# Patient Record
Sex: MALE | Race: WHITE | Employment: FULL TIME | URBAN - METROPOLITAN AREA
[De-identification: names, ages, dates, MRNs, and addresses within clinical notes are randomized per-mention and may not be internally consistent; named-entity substitution may affect disease eponyms.]

---
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
## 2019-04-20 ENCOUNTER — OFFICE VISIT (OUTPATIENT)
Dept: URGENT CARE | Facility: URGENT CARE | Age: 19
End: 2019-04-20
Payer: COMMERCIAL

## 2019-04-20 VITALS
DIASTOLIC BLOOD PRESSURE: 72 MMHG | SYSTOLIC BLOOD PRESSURE: 109 MMHG | OXYGEN SATURATION: 96 % | HEART RATE: 99 BPM | TEMPERATURE: 98.2 F

## 2019-04-20 DIAGNOSIS — H10.32 ACUTE CONJUNCTIVITIS OF LEFT EYE, UNSPECIFIED ACUTE CONJUNCTIVITIS TYPE: Primary | ICD-10-CM

## 2019-04-20 PROCEDURE — 99213 OFFICE O/P EST LOW 20 MIN: CPT | Performed by: PHYSICIAN ASSISTANT

## 2019-04-20 RX ORDER — NEOMYCIN POLYMYXIN B SULFATES AND DEXAMETHASONE 3.5; 10000; 1 MG/ML; [USP'U]/ML; MG/ML
1 SUSPENSION/ DROPS OPHTHALMIC 3 TIMES DAILY
Qty: 5 ML | Refills: 0 | Status: SHIPPED | OUTPATIENT
Start: 2019-04-20 | End: 2019-04-27

## 2019-04-20 ASSESSMENT — ENCOUNTER SYMPTOMS
EYE DISCHARGE: 0
EYE PAIN: 1
EYE REDNESS: 1
FEVER: 0
COUGH: 0

## 2019-04-20 NOTE — PROGRESS NOTES
SUBJECTIVE:   Oleg De La O is a 18 year old male presenting with a chief complaint of   Chief Complaint   Patient presents with     Urgent Care     Eye Problem     Possible Lt eye infection x1 week, slept with contacts in- throbbing pain, visible redness       He is an established patient of [facility].    Eye Problem    Onset of symptoms was a few days ago.   Location: left eye   Course of illness is worsening.    Severity moderate  Current and Associated symptoms: pain, redness  Treatment measures tried include none  Context: Contact lens use. He slept with his contacts in.      Review of Systems   Constitutional: Negative for fever.   HENT: Negative for ear pain.    Eyes: Positive for pain and redness. Negative for discharge and visual disturbance.   Respiratory: Negative for cough.        History reviewed. No pertinent past medical history.  Family History   Problem Relation Age of Onset     Family History Negative Mother      Asthma Father      Depression Maternal Grandmother      Diabetes Maternal Grandmother         borderline     Current Outpatient Medications   Medication Sig Dispense Refill     citalopram (CELEXA) 10 MG tablet TAKE ONE TABLET BY MOUTH ONCE DAILY 60 tablet 0     neomycin-polymixin-dexamethasone (MAXITROL) 0.1 % ophthalmic suspension Place 1 drop Into the left eye 3 times daily for 7 days 5 mL 0     Acetaminophen (TYLENOL PO) Reported on 5/19/2017       ibuprofen (ADVIL/MOTRIN) 200 MG tablet Take 3 tablets (600 mg) by mouth every 6 hours as needed for mild pain or fever (Patient not taking: Reported on 12/28/2018) 60 tablet 0     Social History     Tobacco Use     Smoking status: Never Smoker     Smokeless tobacco: Never Used   Substance Use Topics     Alcohol use: No     Alcohol/week: 0.0 oz       OBJECTIVE  /72 (BP Location: Right arm, Patient Position: Chair, Cuff Size: Adult Regular)   Pulse 99   Temp 98.2  F (36.8  C) (Oral)   SpO2 96%     Physical Exam   Constitutional: He  appears well-developed. No distress.   HENT:   Head: Normocephalic.   Eyes: Pupils are equal, round, and reactive to light. EOM are normal. Right eye exhibits no discharge. Left eye exhibits no discharge. Left conjunctiva is injected.   No tenderness to palpation over orbital rim. No evidence of orbital cellulitis.   Cardiovascular: Regular rhythm and normal heart sounds.   Pulmonary/Chest: Effort normal and breath sounds normal. No respiratory distress.   Neurological: He is alert.       Labs:  No results found for this or any previous visit (from the past 24 hour(s)).        ASSESSMENT:      ICD-10-CM    1. Acute conjunctivitis of left eye, unspecified acute conjunctivitis type H10.32 neomycin-polymixin-dexamethasone (MAXITROL) 0.1 % ophthalmic suspension          PLAN:    Acute conjunctivitis, left: Maxitrol eyedrops are prescribed.  Good handwashing is advised. Should not wear contact lens until infection has cleared.  Follow-up if any worsening symptoms. Patient understands and agrees with the plan.        Followup:    If not improving or if condition worsens, follow up with your Primary Care Provider

## 2019-07-30 ENCOUNTER — OFFICE VISIT (OUTPATIENT)
Dept: URGENT CARE | Facility: URGENT CARE | Age: 19
End: 2019-07-30
Payer: COMMERCIAL

## 2019-07-30 VITALS
HEART RATE: 60 BPM | DIASTOLIC BLOOD PRESSURE: 70 MMHG | WEIGHT: 165.7 LBS | SYSTOLIC BLOOD PRESSURE: 122 MMHG | HEIGHT: 72 IN | OXYGEN SATURATION: 97 % | TEMPERATURE: 98.7 F | RESPIRATION RATE: 12 BRPM | BODY MASS INDEX: 22.44 KG/M2

## 2019-07-30 DIAGNOSIS — R21 RASH: Primary | ICD-10-CM

## 2019-07-30 DIAGNOSIS — N48.9 PENILE LESION: ICD-10-CM

## 2019-07-30 PROCEDURE — 99213 OFFICE O/P EST LOW 20 MIN: CPT | Performed by: FAMILY MEDICINE

## 2019-07-30 RX ORDER — PREDNISONE 20 MG/1
TABLET ORAL
Qty: 8 TABLET | Refills: 0 | Status: SHIPPED | OUTPATIENT
Start: 2019-07-30 | End: 2019-11-11

## 2019-07-30 ASSESSMENT — MIFFLIN-ST. JEOR: SCORE: 1804.61

## 2019-07-30 NOTE — PATIENT INSTRUCTIONS
Prednisone 40mg a day for 3 days, 20mg a day for 2 days  -- take this in the morning each day    If your itching becomes bothersome try benadryl 25-50mg every 8 hours as needed (careful as this can make you drowsy)

## 2019-11-11 ENCOUNTER — ANCILLARY PROCEDURE (OUTPATIENT)
Dept: GENERAL RADIOLOGY | Facility: CLINIC | Age: 19
End: 2019-11-11
Attending: FAMILY MEDICINE
Payer: COMMERCIAL

## 2019-11-11 ENCOUNTER — OFFICE VISIT (OUTPATIENT)
Dept: URGENT CARE | Facility: URGENT CARE | Age: 19
End: 2019-11-11
Payer: COMMERCIAL

## 2019-11-11 VITALS
WEIGHT: 172.6 LBS | DIASTOLIC BLOOD PRESSURE: 86 MMHG | BODY MASS INDEX: 23.41 KG/M2 | TEMPERATURE: 98.4 F | HEART RATE: 73 BPM | OXYGEN SATURATION: 95 % | SYSTOLIC BLOOD PRESSURE: 120 MMHG

## 2019-11-11 DIAGNOSIS — J20.9 ACUTE BRONCHITIS, UNSPECIFIED ORGANISM: ICD-10-CM

## 2019-11-11 DIAGNOSIS — R07.0 THROAT PAIN: Primary | ICD-10-CM

## 2019-11-11 DIAGNOSIS — R05.9 COUGH: ICD-10-CM

## 2019-11-11 LAB
DEPRECATED S PYO AG THROAT QL EIA: NORMAL
HETEROPH AB SER QL: NEGATIVE
SPECIMEN SOURCE: NORMAL

## 2019-11-11 PROCEDURE — 99214 OFFICE O/P EST MOD 30 MIN: CPT | Performed by: FAMILY MEDICINE

## 2019-11-11 PROCEDURE — 86308 HETEROPHILE ANTIBODY SCREEN: CPT | Performed by: FAMILY MEDICINE

## 2019-11-11 PROCEDURE — 36415 COLL VENOUS BLD VENIPUNCTURE: CPT | Performed by: FAMILY MEDICINE

## 2019-11-11 PROCEDURE — 87798 DETECT AGENT NOS DNA AMP: CPT | Performed by: FAMILY MEDICINE

## 2019-11-11 PROCEDURE — 71046 X-RAY EXAM CHEST 2 VIEWS: CPT

## 2019-11-11 PROCEDURE — 87880 STREP A ASSAY W/OPTIC: CPT | Performed by: FAMILY MEDICINE

## 2019-11-11 RX ORDER — CODEINE PHOSPHATE AND GUAIFENESIN 10; 100 MG/5ML; MG/5ML
2 SOLUTION ORAL EVERY 6 HOURS PRN
Qty: 118 ML | Refills: 0 | Status: SHIPPED | OUTPATIENT
Start: 2019-11-11 | End: 2020-01-14

## 2019-11-11 RX ORDER — BENZONATATE 200 MG/1
200 CAPSULE ORAL 3 TIMES DAILY PRN
Qty: 30 CAPSULE | Refills: 0 | Status: SHIPPED | OUTPATIENT
Start: 2019-11-11 | End: 2020-01-14

## 2019-11-11 NOTE — PATIENT INSTRUCTIONS
Okay to take ibuprofen 200 mg - 4 tablets (800 mg) every 8 hours as needed.  Okay to take tylenol 500 mg - 2 tablets (1000 mg) every 6-8 hours as needed, do not exceed 3000 mg in 24 hours.  Take tessalon perle for cough during the day and robitussin with codeine at bedtime.    We will contact you if the results are positive - throat culture, pertussis screen and Xray formal report.      Patient Education     Acute Bronchitis  Your healthcare provider has told you that you have acute bronchitis. Bronchitis is infection or inflammation of the bronchial tubes (airways in the lungs). Normally, air moves easily in and out of the airways. Bronchitis narrows the airways, making it harder for air to flow in and out of the lungs. This causes symptoms such as shortness of breath, coughing up yellow or green mucus, and wheezing. Bronchitis can be acute or chronic. Acute means the condition comes on quickly and goes away in a short time, usually within 3 to 10 days. Chronic means a condition lasts a long time and often comes back.    What causes acute bronchitis?  Acute bronchitis almost always starts as a viral respiratory infection, such as a cold or the flu. Certain factors make it more likely for a cold or flu to turn into bronchitis. These include being very young, being elderly, having a heart or lung problem, or having a weak immune system. Cigarette smoking also makes bronchitis more likely.  When bronchitis develops, the airways become swollen. The airways may also become infected with bacteria. This is known as a secondary infection.  Diagnosing acute bronchitis  Your healthcare provider will examine you and ask about your symptoms and health history. You may also have a sputum culture to test the fluid in your lungs. Chest X-rays may be done to look for infection in the lungs.  Treating acute bronchitis  Bronchitis usually clears up as the cold or flu goes away. You can help feel better faster by doing the  following:    Take medicine as directed. You may be told to take ibuprofen or other over-the-counter medicines. These help relieve inflammation in your bronchial tubes. Your healthcare provider may prescribe an inhaler to help open up the bronchial tubes. Most of the time, acute bronchitis is caused by a viral infection. Antibiotics are usually not prescribed for viral infections.    Drink plenty of fluids, such as water, juice, or warm soup. Fluids loosen mucus so that you can cough it up. This helps you breathe more easily. Fluids also prevent dehydration.    Make sure you get plenty of rest.    Do not smoke. Do not allow anyone else to smoke in your home.  Recovery and follow-up  Follow up with your doctor as you are told. You will likely feel better in a week or two. But a dry cough can linger beyond that time. Let your doctor know if you still have symptoms (other than a dry cough) after 2 weeks, or if you re prone to getting bronchial infections. Take steps to protect yourself from future infections. These steps include stopping smoking and avoiding tobacco smoke, washing your hands often, and getting a yearly flu shot.  When to call your healthcare provider  Call the healthcare provider if you have any of the following:    Fever of 100.4 F (38.0 C) or higher, or as advised    Symptoms that get worse, or new symptoms    Trouble breathing    Symptoms that don t start to improve within a week, or within 3 days of taking antibiotics   Date Last Reviewed: 12/1/2016 2000-2018 [employer]. [address] [place] [address], [place] [address]. All rights reserved. This information is not intended as a substitute for professional medical care. Always follow your healthcare professional's instructions.

## 2019-11-11 NOTE — PROGRESS NOTES
SUBJECTIVE:   Oleg De La O is a 18 year old male presenting with a chief complaint of sore throat, cough and congestion.  Sore throat has gotten worse.  No fever.  Endorsed feeling hot and cold symptoms.  Denies any N/V/D or rash.  Denies prior history of mono.  Coughing spells, will wake up and go into coughing fits  Onset of symptoms was 1 week(s) ago.  Course of illness is worsening.    Severity moderate  Current and Associated symptoms: sore throat, cough  Treatment measures tried include Fluids, Rest and Dayquil, Nyquil.  Predisposing factors include: TOB use.    Tdap 2012    No past medical history on file.  Current Outpatient Medications   Medication Sig Dispense Refill     Acetaminophen (TYLENOL PO) Reported on 5/19/2017       citalopram (CELEXA) 10 MG tablet TAKE ONE TABLET BY MOUTH ONCE DAILY 60 tablet 0     ibuprofen (ADVIL/MOTRIN) 200 MG tablet Take 3 tablets (600 mg) by mouth every 6 hours as needed for mild pain or fever 60 tablet 0     Social History     Tobacco Use     Smoking status: Current Some Day Smoker     Packs/day: 0.00     Smokeless tobacco: Never Used   Substance Use Topics     Alcohol use: No     Alcohol/week: 0.0 standard drinks       ROS:  Review of systems negative except as stated above.    OBJECTIVE:  /86 (BP Location: Right arm, Patient Position: Sitting, Cuff Size: Adult Regular)   Pulse 73   Temp 98.4  F (36.9  C) (Tympanic)   Wt 78.3 kg (172 lb 9.6 oz)   SpO2 95%   BMI 23.41 kg/m    GENERAL APPEARANCE: healthy, alert and no distress  EYES: EOMI,  PERRL, conjunctiva clear  HENT: ear canals and TM's normal.  Nose and mouth without ulcers, erythema or lesions.  No sinus tenderness on percussion  NECK: supple, nontender, no lymphadenopathy  RESP: lungs few scattered rhonchi, coarse, no crackles or wheezes  CV: regular rates and rhythm, normal S1 S2, no murmur noted  Extremities: no peripheral edema or tenderness, peripheral pulses normal  PSYCH: mentation appears  normal and affect normal/bright    Results for orders placed or performed in visit on 11/11/19   Mononucleosis screen     Status: None   Result Value Ref Range    Mononucleosis Screen Negative NEG^Negative   Strep, Rapid Screen     Status: None   Result Value Ref Range    Specimen Description Throat     Rapid Strep A Screen       NEGATIVE: No Group A streptococcal antigen detected by immunoassay, await culture report.       CXR - no acute infiltrate, no pleural effusion, no pneumothorax personally viewed by me    ASSESSMENT/PLAN:  (R07.0) Throat pain  (primary encounter diagnosis)  Comment: viral  Plan: Strep, Rapid Screen, Beta strep group A         culture, Mononucleosis screen            (R05) Cough  Comment: viral/bronchitis  Plan: B. pertussis/parapertussis PCR-NP, XR Chest 2         Views, benzonatate (TESSALON) 200 MG capsule,         guaiFENesin-codeine (ROBITUSSIN AC) 100-10         MG/5ML solution            (J20.9) Acute bronchitis, unspecified organism  Comment: viral  Plan: benzonatate (TESSALON) 200 MG capsule,         guaiFENesin-codeine (ROBITUSSIN AC) 100-10         MG/5ML solution            Reassurance given, reviewed initial labs, discussed symptomatic treatment with tylenol, ibuprofen, plenty of fluids and rest.  Will follow up on throat culture and treat if positive for strep.  Reviewed that due to coughing and coughing fits, will screen for pertussis, has an 8 month old infant at home.  Reviewed that if positive for pertussis that will require antibiotic treatment, but the cough will linger for many months.  RX tessalon perles and RX luis enrique AC given to help with cough.  Will follow up on formal Xray report and notify if any abnormalities.    Follow up with primary provider if no improvement of symptoms in 1 week    Ever San MD, MD  November 11, 2019 5:36 PM

## 2019-11-13 LAB
B PARAPERT DNA SPEC QL NAA+PROBE: NOT DETECTED
B PERT DNA SPEC QL NAA+PROBE: NOT DETECTED
BORDETELLA COMMENT: NORMAL

## 2019-12-11 ENCOUNTER — OFFICE VISIT (OUTPATIENT)
Dept: PEDIATRICS | Facility: CLINIC | Age: 19
End: 2019-12-11
Payer: COMMERCIAL

## 2019-12-11 DIAGNOSIS — Q15.9 EYE ABNORMALITIES: Primary | ICD-10-CM

## 2019-12-11 PROCEDURE — 99213 OFFICE O/P EST LOW 20 MIN: CPT | Performed by: PEDIATRICS

## 2019-12-11 ASSESSMENT — PATIENT HEALTH QUESTIONNAIRE - PHQ9: SUM OF ALL RESPONSES TO PHQ QUESTIONS 1-9: 7

## 2019-12-12 NOTE — PROGRESS NOTES
Subjective    Oleg De La O is a 18 year old male who presents to clinic today with self because of:  Red Eye     HPI   Eye Problem    Problem started: 1 months ago  Location:  Both  Pain:  YES  Redness:  YES  Discharge:  no  Swelling  YES  Vision problems:  no  History of trauma or foreign body:  no  Sick contacts: None;  Therapies Tried: eye drops    Would wake up and some irritation corner of eye.    Would try to wash it with water but would continue feeling like something there.    Off/on hurting.   Not waking up with crusting.   Little watery.  Varies which eye is bothering him by week.  Red out type drops.  Using contacts.  If happens takes out contacts.  Did have it happen one time when wasn't wearing.    Has had some ?arthritis in past.  Not really having joint issue currently.    Review of Systems  Constitutional, eye, ENT, skin, respiratory, cardiac, and GI are normal except as otherwise noted.    Problem List  Patient Active Problem List    Diagnosis Date Noted     Chlamydia infection 07/04/2018     Priority: Medium     Moderate episode of recurrent major depressive disorder (H) 01/26/2018     Priority: Medium     Anxiety 01/26/2018     Priority: Medium     Concussion, without loss of consciousness, subsequent encounter 05/21/2017     Priority: Medium     Glenoid labral tear 04/24/2015     Priority: Medium     Right shoulder pain 03/11/2015     Priority: Medium     No active medical problems 08/17/2012     Priority: Medium      Medications  citalopram (CELEXA) 10 MG tablet, TAKE ONE TABLET BY MOUTH ONCE DAILY  Acetaminophen (TYLENOL PO), Reported on 5/19/2017  benzonatate (TESSALON) 200 MG capsule, Take 1 capsule (200 mg) by mouth 3 times daily as needed for cough (Patient not taking: Reported on 12/11/2019)  guaiFENesin-codeine (ROBITUSSIN AC) 100-10 MG/5ML solution, Take 10 mLs by mouth every 6 hours as needed for cough (Patient not taking: Reported on 12/11/2019)  ibuprofen (ADVIL/MOTRIN) 200 MG  tablet, Take 3 tablets (600 mg) by mouth every 6 hours as needed for mild pain or fever (Patient not taking: Reported on 12/11/2019)    No current facility-administered medications on file prior to visit.     Allergies  Allergies   Allergen Reactions     No Known Drug Allergies      Reviewed and updated as needed this visit by Provider           Objective    There were no vitals taken for this visit.  No weight on file for this encounter.    Physical Exam  GENERAL: Active, alert, in no acute distress.  SKIN: Clear. No significant rash, abnormal pigmentation or lesions  HEAD: Normocephalic.  EYES: sclera with quite a bit of redness laterall on left.  No mattering.  EARS: Normal canals. Tympanic membranes are normal; gray and translucent.  NOSE: Normal without discharge.  MOUTH/THROAT: Clear. No oral lesions. Teeth intact without obvious abnormalities.  NECK: Supple, no masses.  LYMPH NODES: No adenopathy  LUNGS: Clear. No rales, rhonchi, wheezing or retractions  HEART: Regular rhythm. Normal S1/S2. No murmurs.  ABDOMEN: Soft, non-tender, not distended, no masses or hepatosplenomegaly. Bowel sounds normal.     Diagnostics: None      Assessment & Plan    Irritation of eye.  Would have concerns around inflammation/possible association with inflammatory process.  Also concerns around issues with contacts/irritation.  Will give referral, reviewed having him seen within two weeks    Follow Up  Return in about 2 weeks (around 12/25/2019) for call if not getting in within couple weeks..  Plan:  Referal(s) given today as per orders.     Horacio Quintanilla MD

## 2020-01-14 ENCOUNTER — ANCILLARY PROCEDURE (OUTPATIENT)
Dept: GENERAL RADIOLOGY | Facility: CLINIC | Age: 20
End: 2020-01-14
Attending: FAMILY MEDICINE
Payer: COMMERCIAL

## 2020-01-14 ENCOUNTER — OFFICE VISIT (OUTPATIENT)
Dept: URGENT CARE | Facility: URGENT CARE | Age: 20
End: 2020-01-14
Payer: COMMERCIAL

## 2020-01-14 VITALS
DIASTOLIC BLOOD PRESSURE: 70 MMHG | RESPIRATION RATE: 18 BRPM | BODY MASS INDEX: 23.03 KG/M2 | HEIGHT: 72 IN | TEMPERATURE: 99.8 F | OXYGEN SATURATION: 97 % | HEART RATE: 120 BPM | SYSTOLIC BLOOD PRESSURE: 120 MMHG | WEIGHT: 170 LBS

## 2020-01-14 DIAGNOSIS — R19.7 DIARRHEA, UNSPECIFIED TYPE: Primary | ICD-10-CM

## 2020-01-14 LAB
BASOPHILS # BLD AUTO: 0 10E9/L (ref 0–0.2)
BASOPHILS NFR BLD AUTO: 0.1 %
DIFFERENTIAL METHOD BLD: ABNORMAL
EOSINOPHIL # BLD AUTO: 0 10E9/L (ref 0–0.7)
EOSINOPHIL NFR BLD AUTO: 0.3 %
ERYTHROCYTE [DISTWIDTH] IN BLOOD BY AUTOMATED COUNT: 13.5 % (ref 10–15)
FLUAV+FLUBV AG SPEC QL: NEGATIVE
FLUAV+FLUBV AG SPEC QL: NEGATIVE
HCT VFR BLD AUTO: 48.2 % (ref 40–53)
HGB BLD-MCNC: 17 G/DL (ref 13.3–17.7)
LYMPHOCYTES # BLD AUTO: 0.4 10E9/L (ref 0.8–5.3)
LYMPHOCYTES NFR BLD AUTO: 2.6 %
MCH RBC QN AUTO: 29.7 PG (ref 26.5–33)
MCHC RBC AUTO-ENTMCNC: 35.3 G/DL (ref 31.5–36.5)
MCV RBC AUTO: 84 FL (ref 78–100)
MONOCYTES # BLD AUTO: 0.8 10E9/L (ref 0–1.3)
MONOCYTES NFR BLD AUTO: 5.5 %
NEUTROPHILS # BLD AUTO: 12.9 10E9/L (ref 1.6–8.3)
NEUTROPHILS NFR BLD AUTO: 91.5 %
PLATELET # BLD AUTO: 300 10E9/L (ref 150–450)
RBC # BLD AUTO: 5.73 10E12/L (ref 4.4–5.9)
SPECIMEN SOURCE: NORMAL
WBC # BLD AUTO: 14.1 10E9/L (ref 4–11)

## 2020-01-14 PROCEDURE — 36415 COLL VENOUS BLD VENIPUNCTURE: CPT | Performed by: FAMILY MEDICINE

## 2020-01-14 PROCEDURE — 74019 RADEX ABDOMEN 2 VIEWS: CPT

## 2020-01-14 PROCEDURE — 87804 INFLUENZA ASSAY W/OPTIC: CPT | Mod: 59 | Performed by: FAMILY MEDICINE

## 2020-01-14 PROCEDURE — 83690 ASSAY OF LIPASE: CPT | Performed by: FAMILY MEDICINE

## 2020-01-14 PROCEDURE — 85025 COMPLETE CBC W/AUTO DIFF WBC: CPT | Performed by: FAMILY MEDICINE

## 2020-01-14 PROCEDURE — 80076 HEPATIC FUNCTION PANEL: CPT | Performed by: FAMILY MEDICINE

## 2020-01-14 PROCEDURE — 99214 OFFICE O/P EST MOD 30 MIN: CPT | Performed by: FAMILY MEDICINE

## 2020-01-14 RX ORDER — OMEPRAZOLE 40 MG/1
40 CAPSULE, DELAYED RELEASE ORAL DAILY
Qty: 20 CAPSULE | Refills: 1 | Status: SHIPPED | OUTPATIENT
Start: 2020-01-14 | End: 2020-09-29

## 2020-01-14 RX ORDER — ONDANSETRON 4 MG/1
4 TABLET, FILM COATED ORAL EVERY 8 HOURS PRN
Qty: 12 TABLET | Refills: 0 | Status: SHIPPED | OUTPATIENT
Start: 2020-01-14 | End: 2020-09-29

## 2020-01-14 ASSESSMENT — ENCOUNTER SYMPTOMS
NAUSEA: 1
VOMITING: 1
DIARRHEA: 1

## 2020-01-14 ASSESSMENT — MIFFLIN-ST. JEOR: SCORE: 1819.11

## 2020-01-15 LAB
ALBUMIN SERPL-MCNC: 4.7 G/DL (ref 3.4–5)
ALP SERPL-CCNC: 84 U/L (ref 40–150)
ALT SERPL W P-5'-P-CCNC: 17 U/L (ref 0–70)
AST SERPL W P-5'-P-CCNC: 20 U/L (ref 0–45)
BILIRUB DIRECT SERPL-MCNC: 0.2 MG/DL (ref 0–0.2)
BILIRUB SERPL-MCNC: 1 MG/DL (ref 0.2–1.3)
LIPASE SERPL-CCNC: 48 U/L (ref 73–393)
PROT SERPL-MCNC: 8.5 G/DL (ref 6.8–8.8)

## 2020-01-15 NOTE — PROGRESS NOTES
SUBJECTIVE:   Oleg De La O is a 19 year old male presenting with a chief complaint of   Chief Complaint   Patient presents with     Gastrointestinal Problem     Acute onset of nausea and vomiting that started about 3:00 in the morning.  This was followed by diarrhea.    He is an established patient of [facility].    Abdominal Pain    Location: epigastric   Radiation: None.    Pain character: aching,   Severity: 6 on a scale of 1-10.    Duration: 1 day(s)   Course of Illness: fluctuating.  Exacerbated by: nothing  Relieved by: nothing.  Associated Symptoms: nausea, vomiting and diarrhea.  Female : Not applicable  Surgical History: none        Review of Systems   Gastrointestinal: Positive for diarrhea, nausea and vomiting.   All other systems reviewed and are negative.      No past medical history on file.  Family History   Problem Relation Age of Onset     Family History Negative Mother      Asthma Father      Depression Maternal Grandmother      Diabetes Maternal Grandmother         borderline     Current Outpatient Medications   Medication Sig Dispense Refill     Acetaminophen (TYLENOL PO) Reported on 5/19/2017       citalopram (CELEXA) 10 MG tablet TAKE ONE TABLET BY MOUTH ONCE DAILY 60 tablet 0     ibuprofen (ADVIL/MOTRIN) 200 MG tablet Take 3 tablets (600 mg) by mouth every 6 hours as needed for mild pain or fever 60 tablet 0     omeprazole (PRILOSEC) 40 MG DR capsule Take 1 capsule (40 mg) by mouth daily 20 capsule 1     ondansetron (ZOFRAN) 4 MG tablet Take 1 tablet (4 mg) by mouth every 8 hours as needed for nausea 12 tablet 0     Social History     Tobacco Use     Smoking status: Current Some Day Smoker     Packs/day: 0.00     Smokeless tobacco: Never Used   Substance Use Topics     Alcohol use: No     Alcohol/week: 0.0 standard drinks       OBJECTIVE  /70 (BP Location: Right arm, Patient Position: Chair, Cuff Size: Adult Regular)   Pulse 120   Temp 99.8  F (37.7  C) (Oral)   Resp 18   Ht  1.829 m (6')   Wt 77.1 kg (170 lb)   SpO2 97%   BMI 23.06 kg/m      Physical Exam  Vitals signs and nursing note reviewed.   Constitutional:       Appearance: Normal appearance.   HENT:      Head: Normocephalic.      Right Ear: Tympanic membrane normal.      Left Ear: Tympanic membrane normal.      Nose: Nose normal.   Eyes:      Extraocular Movements: Extraocular movements intact.      Pupils: Pupils are equal, round, and reactive to light.   Neck:      Musculoskeletal: Normal range of motion and neck supple.   Cardiovascular:      Rate and Rhythm: Normal rate and regular rhythm.   Pulmonary:      Effort: Pulmonary effort is normal.      Breath sounds: Normal breath sounds.   Abdominal:      General: Abdomen is flat.      Palpations: Abdomen is soft.      Comments: Slight tenderness palpation lower the umbilicus nothing in the right or left lower quadrants   Musculoskeletal: Normal range of motion.   Skin:     General: Skin is warm and dry.   Neurological:      Mental Status: He is alert.         Labs:  Results for orders placed or performed in visit on 01/14/20 (from the past 24 hour(s))   CBC with platelets and differential   Result Value Ref Range    WBC 14.1 (H) 4.0 - 11.0 10e9/L    RBC Count 5.73 4.4 - 5.9 10e12/L    Hemoglobin 17.0 13.3 - 17.7 g/dL    Hematocrit 48.2 40.0 - 53.0 %    MCV 84 78 - 100 fl    MCH 29.7 26.5 - 33.0 pg    MCHC 35.3 31.5 - 36.5 g/dL    RDW 13.5 10.0 - 15.0 %    Platelet Count 300 150 - 450 10e9/L    % Neutrophils 91.5 %    % Lymphocytes 2.6 %    % Monocytes 5.5 %    % Eosinophils 0.3 %    % Basophils 0.1 %    Absolute Neutrophil 12.9 (H) 1.6 - 8.3 10e9/L    Absolute Lymphocytes 0.4 (L) 0.8 - 5.3 10e9/L    Absolute Monocytes 0.8 0.0 - 1.3 10e9/L    Absolute Eosinophils 0.0 0.0 - 0.7 10e9/L    Absolute Basophils 0.0 0.0 - 0.2 10e9/L    Diff Method Automated Method    Influenza A/B antigen   Result Value Ref Range    Influenza A/B Agn Specimen Nasal     Influenza A Negative  NEG^Negative    Influenza B Negative NEG^Negative       X-Ray was done, my findings are: There is no evidence of obstruction    ASSESSMENT:      ICD-10-CM    1. Diarrhea, unspecified type R19.7 CBC with platelets and differential     Lipase     Hepatic panel     XR Abdomen 2 Views     Influenza A/B antigen     omeprazole (PRILOSEC) 40 MG DR capsule     ondansetron (ZOFRAN) 4 MG tablet      2.  Gastroenteritis    Appears to have a stomach flu.  Is a slightly elevated white count however I do not see any signs or symptoms that suggest an appendicitis however we did talk about this.  Seems to be getting better his diarrhea has stopped     medical Decision Making:    Differential Diagnosis:  Gastroenteritis , abdominal pain, appendicitis, colitis,    Serious Comorbid Conditions:  Adult:  None    PLAN:    1.  Increase his fluids electrolyte formulations such as Pedialyte  2 Zofran.   3.  Prilosec    Followup:    Follow-up in 7 to 10 days with your primary sooner if increasing symptoms    There are no Patient Instructions on file for this visit.

## 2020-02-23 ENCOUNTER — OFFICE VISIT (OUTPATIENT)
Dept: URGENT CARE | Facility: URGENT CARE | Age: 20
End: 2020-02-23
Payer: COMMERCIAL

## 2020-02-23 VITALS
SYSTOLIC BLOOD PRESSURE: 124 MMHG | OXYGEN SATURATION: 97 % | DIASTOLIC BLOOD PRESSURE: 80 MMHG | BODY MASS INDEX: 23.19 KG/M2 | HEART RATE: 68 BPM | WEIGHT: 171 LBS | TEMPERATURE: 98.3 F

## 2020-02-23 DIAGNOSIS — R30.0 DYSURIA: ICD-10-CM

## 2020-02-23 DIAGNOSIS — Z11.3 SCREEN FOR STD (SEXUALLY TRANSMITTED DISEASE): Primary | ICD-10-CM

## 2020-02-23 PROCEDURE — 86780 TREPONEMA PALLIDUM: CPT | Performed by: FAMILY MEDICINE

## 2020-02-23 PROCEDURE — 87491 CHLMYD TRACH DNA AMP PROBE: CPT | Performed by: FAMILY MEDICINE

## 2020-02-23 PROCEDURE — 87591 N.GONORRHOEAE DNA AMP PROB: CPT | Performed by: FAMILY MEDICINE

## 2020-02-23 PROCEDURE — 86803 HEPATITIS C AB TEST: CPT | Performed by: FAMILY MEDICINE

## 2020-02-23 PROCEDURE — 87340 HEPATITIS B SURFACE AG IA: CPT | Performed by: FAMILY MEDICINE

## 2020-02-23 PROCEDURE — 87389 HIV-1 AG W/HIV-1&-2 AB AG IA: CPT | Performed by: FAMILY MEDICINE

## 2020-02-23 PROCEDURE — 36415 COLL VENOUS BLD VENIPUNCTURE: CPT | Performed by: FAMILY MEDICINE

## 2020-02-23 PROCEDURE — 99213 OFFICE O/P EST LOW 20 MIN: CPT | Performed by: FAMILY MEDICINE

## 2020-02-23 PROCEDURE — 86704 HEP B CORE ANTIBODY TOTAL: CPT | Performed by: FAMILY MEDICINE

## 2020-02-23 NOTE — PROGRESS NOTES
SUBJECTIVE:   Oleg De La O is a 19 year old male with past h/o chlamydia  presenting for std testing   Has sexual intercourse with new partner 7 days back and would like std testing   Denies urinary frequency or burning with urination   He is an established patient of [facility].  Severity mild  Has minor burning with urination just noticed it today when collecting urine   Current and Associated symptoms: none   Treatment measures tried include None tried.  Predisposing factors include new partner .    History reviewed. No pertinent past medical history.  Current Outpatient Medications   Medication Sig Dispense Refill     Acetaminophen (TYLENOL PO) Reported on 5/19/2017       citalopram (CELEXA) 10 MG tablet TAKE ONE TABLET BY MOUTH ONCE DAILY (Patient not taking: Reported on 2/23/2020) 60 tablet 0     ibuprofen (ADVIL/MOTRIN) 200 MG tablet Take 3 tablets (600 mg) by mouth every 6 hours as needed for mild pain or fever (Patient not taking: Reported on 2/23/2020) 60 tablet 0     omeprazole (PRILOSEC) 40 MG DR capsule Take 1 capsule (40 mg) by mouth daily (Patient not taking: Reported on 2/23/2020) 20 capsule 1     ondansetron (ZOFRAN) 4 MG tablet Take 1 tablet (4 mg) by mouth every 8 hours as needed for nausea (Patient not taking: Reported on 2/23/2020) 12 tablet 0     Social History     Tobacco Use     Smoking status: Current Some Day Smoker     Packs/day: 0.00     Smokeless tobacco: Never Used   Substance Use Topics     Alcohol use: No     Alcohol/week: 0.0 standard drinks     Family History   Problem Relation Age of Onset     Family History Negative Mother      Asthma Father      Depression Maternal Grandmother      Diabetes Maternal Grandmother         borderline         ROS:    10 point ROS of systems including Constitutional, Eyes, Respiratory, Cardiovascular, Gastroenterology,  Integumentary, Muscularskeletal, Psychiatric were all negative except for pertinent positives noted in my HPI          OBJECTIVE:  /80 (BP Location: Right arm, Patient Position: Chair, Cuff Size: Adult Regular)   Pulse 68   Temp 98.3  F (36.8  C) (Oral)   Wt 77.6 kg (171 lb)   SpO2 97%   BMI 23.19 kg/m    GENERAL APPEARANCE: healthy, alert and no distress  EYES: EOMI,  PERRL, conjunctiva clear  HENT: ear canals and TM's normal.  Nose and mouth without ulcers, erythema or lesions  NECK: supple, nontender, no lymphadenopathy  RESP: lungs clear to auscultation - no rales, rhonchi or wheezes  CV: regular rates and rhythm, normal S1 S2, no murmur noted  ABDOMEN:  soft, nontender, no HSM or masses and bowel sounds normal  SKIN: no suspicious lesions or rashes  - deferred  PSYCH: mentation appears normal  Physical Exam          Medical Decision Making:    Differential Diagnosis:  UTI: UTI, Dysuria, Kidney Stone, Urethritis, STD and Interstitial Cystitis      ASSESSMENT:  Oleg was seen today for urgent care and std.    Diagnoses and all orders for this visit:    Screen for STD (sexually transmitted disease)  -     NEISSERIA GONORRHOEA PCR  -     CHLAMYDIA TRACHOMATIS PCR  -     Hepatitis B core antibody  -     Hepatitis B surface antigen  -     Hepatitis C antibody  -     HIV Antigen Antibody Combo  -     Treponema Abs w Reflex to RPR and Titer    Dysuria  -     UA reflex to Microscopic and Culture          PLAN:  Fluids and Rest  Follow up if  symptoms fail to improve or worsens   Pt understood and agreed with plan   He was notified that he will be called with the results only if there is any test result positive for infection.  In the meantime patient was suggested if notices any new symptoms such as worsening dysuria or any penile discharge should follow-up.  Follow up if  symptoms fail to improve or worsens   Pt understood and agreed with plan     Nhung Peres MD       See orders in Epic

## 2020-02-24 ENCOUNTER — TELEPHONE (OUTPATIENT)
Dept: URGENT CARE | Facility: URGENT CARE | Age: 20
End: 2020-02-24

## 2020-02-24 DIAGNOSIS — A74.9 CHLAMYDIA: Primary | ICD-10-CM

## 2020-02-24 LAB
C TRACH DNA SPEC QL NAA+PROBE: POSITIVE
HBV CORE AB SERPL QL IA: NONREACTIVE
HBV SURFACE AG SERPL QL IA: NONREACTIVE
HCV AB SERPL QL IA: NONREACTIVE
HIV 1+2 AB+HIV1 P24 AG SERPL QL IA: NONREACTIVE
N GONORRHOEA DNA SPEC QL NAA+PROBE: NEGATIVE
SPECIMEN SOURCE: ABNORMAL
SPECIMEN SOURCE: NORMAL
T PALLIDUM AB SER QL: NONREACTIVE

## 2020-02-24 RX ORDER — AZITHROMYCIN 500 MG/1
1000 TABLET, FILM COATED ORAL ONCE
Qty: 2 TABLET | Refills: 0 | Status: SHIPPED | OUTPATIENT
Start: 2020-02-24 | End: 2020-02-24

## 2020-02-24 NOTE — TELEPHONE ENCOUNTER
Positive chlamydia- spoke with patient-  Sent Rx of azithromycin 1 gm to [facility] [address]  Recommend informing any sexual partners    Marisol Echeverria MD

## 2020-03-04 ENCOUNTER — OFFICE VISIT (OUTPATIENT)
Dept: URGENT CARE | Facility: URGENT CARE | Age: 20
End: 2020-03-04
Payer: COMMERCIAL

## 2020-03-04 VITALS
SYSTOLIC BLOOD PRESSURE: 112 MMHG | HEART RATE: 68 BPM | TEMPERATURE: 97.8 F | OXYGEN SATURATION: 98 % | DIASTOLIC BLOOD PRESSURE: 68 MMHG

## 2020-03-04 DIAGNOSIS — Z86.19 HISTORY OF CHLAMYDIA: Primary | ICD-10-CM

## 2020-03-04 PROCEDURE — 99213 OFFICE O/P EST LOW 20 MIN: CPT | Performed by: PHYSICIAN ASSISTANT

## 2020-03-05 ASSESSMENT — ENCOUNTER SYMPTOMS
NAUSEA: 0
VOMITING: 0
CHILLS: 0
DYSURIA: 0
ABDOMINAL PAIN: 0
FEVER: 0
DIARRHEA: 0

## 2020-03-05 NOTE — PROGRESS NOTES
SUBJECTIVE:   Oleg De La O is a 19 year old male presenting with a chief complaint of   Chief Complaint   Patient presents with     Urgent Care     STD     Had been Dx with positive STD -1 week ago, last dose of ABX 2/24.       He is an established patient of [facility].    Patient presenting to urgent care today for a recheck. He was diagnosed with chlamydia 2/24/2020. Treated with 1g Zithromax. Patient denies today any abnormal penile discharge. He would like to be retested to make sure the infection has cleared. No fever/chills. No abdominal pain. No dysuria. Denies penile lesion. No longer with previous sexual partner.    Review of Systems   Constitutional: Negative for chills and fever.   Gastrointestinal: Negative for abdominal pain, diarrhea, nausea and vomiting.   Genitourinary: Negative for discharge, dysuria and genital sores.       History reviewed. No pertinent past medical history.  Family History   Problem Relation Age of Onset     Family History Negative Mother      Asthma Father      Depression Maternal Grandmother      Diabetes Maternal Grandmother         borderline     Current Outpatient Medications   Medication Sig Dispense Refill     Acetaminophen (TYLENOL PO) Reported on 5/19/2017       citalopram (CELEXA) 10 MG tablet TAKE ONE TABLET BY MOUTH ONCE DAILY (Patient not taking: Reported on 2/23/2020) 60 tablet 0     ibuprofen (ADVIL/MOTRIN) 200 MG tablet Take 3 tablets (600 mg) by mouth every 6 hours as needed for mild pain or fever (Patient not taking: Reported on 2/23/2020) 60 tablet 0     omeprazole (PRILOSEC) 40 MG DR capsule Take 1 capsule (40 mg) by mouth daily (Patient not taking: Reported on 2/23/2020) 20 capsule 1     ondansetron (ZOFRAN) 4 MG tablet Take 1 tablet (4 mg) by mouth every 8 hours as needed for nausea (Patient not taking: Reported on 2/23/2020) 12 tablet 0     Social History     Tobacco Use     Smoking status: Current Some Day Smoker     Packs/day: 0.00     Smokeless  tobacco: Never Used   Substance Use Topics     Alcohol use: No     Alcohol/week: 0.0 standard drinks       OBJECTIVE  /68 (BP Location: Right arm, Patient Position: Chair, Cuff Size: Adult Regular)   Pulse 68   Temp 97.8  F (36.6  C) (Tympanic)   SpO2 98%     Physical Exam  Vitals signs and nursing note reviewed.   Constitutional:       General: He is not in acute distress.     Appearance: He is well-developed.   HENT:      Head: Normocephalic and atraumatic.      Right Ear: External ear normal.      Left Ear: External ear normal.   Eyes:      Conjunctiva/sclera: Conjunctivae normal.   Neck:      Musculoskeletal: Normal range of motion.   Pulmonary:      Effort: Pulmonary effort is normal. No respiratory distress.   Abdominal:      General: Bowel sounds are normal.      Palpations: Abdomen is soft.   Skin:     General: Skin is warm and dry.   Neurological:      Mental Status: He is alert.         Labs:  No results found for this or any previous visit (from the past 24 hour(s)).        ASSESSMENT:      ICD-10-CM    1. History of chlamydia Z86.19 Neisseria gonorrhoeae PCR     Chlamydia trachomatis PCR            PLAN:    History of chlamydia: currently asymptomatic. I have recommended waiting another 2 weeks before retesting to avoid false positive result. He will be notified with the result. Follow up as needed. He agrees.     Followup:    If not improving or if condition worsens, follow up with your Primary Care Provider

## 2020-09-29 ENCOUNTER — OFFICE VISIT (OUTPATIENT)
Dept: URGENT CARE | Facility: URGENT CARE | Age: 20
End: 2020-09-29
Payer: COMMERCIAL

## 2020-09-29 VITALS
BODY MASS INDEX: 23.72 KG/M2 | WEIGHT: 174.9 LBS | DIASTOLIC BLOOD PRESSURE: 65 MMHG | SYSTOLIC BLOOD PRESSURE: 115 MMHG | TEMPERATURE: 97.6 F | HEART RATE: 83 BPM

## 2020-09-29 DIAGNOSIS — H57.89 IRRITATION OF RIGHT EYE: Primary | ICD-10-CM

## 2020-09-29 PROCEDURE — 99213 OFFICE O/P EST LOW 20 MIN: CPT | Performed by: FAMILY MEDICINE

## 2020-09-29 RX ORDER — ERYTHROMYCIN 5 MG/G
0.5 OINTMENT OPHTHALMIC 3 TIMES DAILY
Qty: 3.5 G | Refills: 0 | Status: SHIPPED | OUTPATIENT
Start: 2020-09-29 | End: 2020-10-04

## 2020-09-30 NOTE — PROGRESS NOTES
SUBJECTIVE:  Chief Complaint:   Chief Complaint   Patient presents with     EYE HURTS     1 WEEK PT BELEIVES SOMETHING MAY BE IN HIS EYE PT STATES HE WORKS AT [employer] AND ANYTHING COULD HAVE GOT INTO HIS EYE PT STATES HE HAS USED VISINE WHICH WAS NOT HELPFUL AT [facility]     History of Present Illness:  Oleg De La O is a 19 year old male who presents complaining of moderate right eye redness for 1 week(s).   Onset/timing: gradual.    Associated Signs and Symptoms: none  Treatment measures tried include: OTC eyedrops, which haven't helped--one allergy-focused, one redness-focused  Contact wearer : Yes     No past medical history on file.  Current Outpatient Medications   Medication Sig Dispense Refill     Acetaminophen (TYLENOL PO) Reported on 5/19/2017       ibuprofen (ADVIL/MOTRIN) 200 MG tablet Take 3 tablets (600 mg) by mouth every 6 hours as needed for mild pain or fever (Patient not taking: Reported on 2/23/2020) 60 tablet 0        ROS:  No fever.  No URI symptoms.    OBJECTIVE:  /65   Pulse 83   Temp 97.6  F (36.4  C)   Wt 79.3 kg (174 lb 14.4 oz)   BMI 23.72 kg/m    General: no acute distress  Eye exam: right eye normal lids, no FB visualized with lid eversion, no corneal injury seen with fluorescein staining; left eye normal lids and conjunctive.  PERRL.  EOMI.  No evidence of periorbital swelling or erythema to suggest cellulitis.    ASSESSMENT:    ICD-10-CM    1. Irritation of right eye  H57.89 erythromycin (ROMYCIN) 5 MG/GM ophthalmic ointment         PLAN:  Erythromycin ointment TID x 5 days.  Follow up with eye doctor if no improvement after 3 days, sooner if worsening.

## 2020-09-30 NOTE — NURSING NOTE
VISION SCREENING    right eye       20/160      left eye         20/20      BOTH  20/20      Screening Performed by JOHN Broussard

## 2020-09-30 NOTE — PATIENT INSTRUCTIONS
Use erythromycin ointment in the right eye three times daily.    If not improving over the next 3 days, follow up with an eye doctor.

## 2020-11-16 ENCOUNTER — HEALTH MAINTENANCE LETTER (OUTPATIENT)
Age: 20
End: 2020-11-16

## 2021-09-18 ENCOUNTER — HEALTH MAINTENANCE LETTER (OUTPATIENT)
Age: 21
End: 2021-09-18

## 2022-01-08 ENCOUNTER — HEALTH MAINTENANCE LETTER (OUTPATIENT)
Age: 22
End: 2022-01-08

## 2022-08-18 ENCOUNTER — OFFICE VISIT (OUTPATIENT)
Dept: URGENT CARE | Facility: URGENT CARE | Age: 22
End: 2022-08-18
Payer: COMMERCIAL

## 2022-08-18 VITALS
HEART RATE: 85 BPM | BODY MASS INDEX: 24.41 KG/M2 | DIASTOLIC BLOOD PRESSURE: 94 MMHG | RESPIRATION RATE: 18 BRPM | SYSTOLIC BLOOD PRESSURE: 126 MMHG | WEIGHT: 180 LBS | OXYGEN SATURATION: 98 %

## 2022-08-18 DIAGNOSIS — J31.0 CHRONIC RHINITIS: ICD-10-CM

## 2022-08-18 DIAGNOSIS — R03.0 ELEVATED BP WITHOUT DIAGNOSIS OF HYPERTENSION: ICD-10-CM

## 2022-08-18 DIAGNOSIS — R05.3 CHRONIC COUGH: Primary | ICD-10-CM

## 2022-08-18 PROCEDURE — 99214 OFFICE O/P EST MOD 30 MIN: CPT | Performed by: PHYSICIAN ASSISTANT

## 2022-08-18 RX ORDER — FLUTICASONE PROPIONATE 50 MCG
1 SPRAY, SUSPENSION (ML) NASAL DAILY
Qty: 16 G | Refills: 0 | Status: SHIPPED | OUTPATIENT
Start: 2022-08-18

## 2022-08-18 RX ORDER — ALBUTEROL SULFATE 90 UG/1
2 AEROSOL, METERED RESPIRATORY (INHALATION) EVERY 6 HOURS
Qty: 18 G | Refills: 0 | Status: SHIPPED | OUTPATIENT
Start: 2022-08-18

## 2022-08-18 RX ORDER — LORATADINE 10 MG/1
10 TABLET ORAL DAILY
Qty: 30 TABLET | Refills: 3 | Status: SHIPPED | OUTPATIENT
Start: 2022-08-18

## 2022-08-18 ASSESSMENT — ENCOUNTER SYMPTOMS
COUGH: 1
FEVER: 0
ABDOMINAL PAIN: 0

## 2022-08-18 NOTE — PATIENT INSTRUCTIONS
1) Use Flonase and Claritin regularly.     2) Use albuterol if you have shortness of breath or wheezing.     3) Avoid smoking.     Thanks,  Krystal Hernandez PA-C

## 2022-08-18 NOTE — LETTER
[facility] [place]  [address]  [facility] [address]  Phone: [phone]  Fax: [phone]    August 18, 2022        Oleg De La O  [address]  [facility] [address]          To whom it may concern:    RE: Oleg De La O    Patient was seen and treated today at our clinic and missed work.    Please contact me for questions or concerns.      Sincerely,        Krystal Hernandez PA-C

## 2022-08-18 NOTE — PROGRESS NOTES
Assessment & Plan:        ICD-10-CM    1. Chronic cough  R05.3 albuterol (PROAIR HFA/PROVENTIL HFA/VENTOLIN HFA) 108 (90 Base) MCG/ACT inhaler   2. Chronic rhinitis  J31.0 loratadine (CLARITIN) 10 MG tablet     fluticasone (FLONASE) 50 MCG/ACT nasal spray   3. Elevated BP without diagnosis of hypertension  R03.0          Plan/Clinical Decision Making:    Patient with several months of dry cough with nasal symptoms.   Normal exam. Will treat with Claritin, Flonase and albuterol inhaler as needed for any wheezing.   Discussed Follow-up in 1-2 weeks if not improved.     BP elevated.  Recommend monitoring and having checked.   Education done.     Patient Instructions   1) Use Flonase and Claritin regularly.     2) Use albuterol if you have shortness of breath or wheezing.     3) Avoid smoking.     Thanks,  Krystal Hernandez PA-C        Return in about 1 week (around 8/25/2022).     At the end of the encounter, I discussed results, diagnosis, medications. Discussed red flags for immediate return to clinic/ER, as well as indications for follow up if no improvement. Patient understood and agreed to plan. Patient was stable for discharge.        Krystal Hernandez PA-C on 8/18/2022 at 5:24 PM          Subjective:     HPI:    Oleg is a 21 year old male who presents to clinic today for the following health issues:  Chief Complaint   Patient presents with     Chronic Cough     Wheezing     HPI    Patient complains of chronic cough x several months. Dry cough and sometimes whistling sound.   Unsure if wheezing. Possible SOB. Doesn't seem to get worse with exercise.   Nothing seems to make cough worse or better.   Has had covid testing at work and negative.   Recently has been having nasal congestion off and on, but unsure if related to ongoing cough.   Smoker: socially on weekends, has avoided due to cough.   No vaping. No drug use.   No hx of asthma or allergies.     SH: Father- has asthma    History obtained from the  patient.    Review of Systems   Constitutional: Negative for fever.   HENT: Positive for congestion.    Respiratory: Positive for cough.    Cardiovascular: Negative for chest pain and leg swelling.   Gastrointestinal: Negative for abdominal pain (no reflux).     Medications: Dayquil off and on, no regular medications.     Patient Active Problem List   Diagnosis     No active medical problems     Right shoulder pain     Glenoid labral tear     Concussion, without loss of consciousness, subsequent encounter     Moderate episode of recurrent major depressive disorder (H)     Anxiety     Chlamydia infection        No past medical history on file.    Social History     Tobacco Use     Smoking status: Current Some Day Smoker     Packs/day: 0.00     Smokeless tobacco: Never Used   Substance Use Topics     Alcohol use: No     Alcohol/week: 0.0 standard drinks             Objective:     Vitals:    08/18/22 1649 08/18/22 1741   BP: (!) 148/60 (!) 126/94   BP Location: Right arm    Patient Position: Chair    Cuff Size: Adult Regular    Pulse: 85    Resp: 18    SpO2: 98%    Weight: 81.6 kg (180 lb)          Physical Exam   EXAM:   Pleasant, alert, appropriate appearance. NAD.  Head Exam: Normocephalic, atraumatic.  Eye Exam:  non icteric/injection.    Ear Exam: TMs grey without bulging. Normal canals.  Normal pinna.  Nose Exam: Normal external nose.    OroPharynx Exam:  Moist mucous membranes. No erythema, pharynx without exudate or hypertrophy.  Neck/Thyroid Exam:  No LAD.  No nodules or enlargement.  Chest/Respiratory Exam: CTAB.  Cardiovascular Exam: RRR. No murmur or rubs.  Ext/musculoskeletal: Grossly intact, No edema  Neuro: CN II-XII intact grossly intact.  Skin: no rash or lesion.      Results:  No results found for any visits on 08/18/22.

## 2022-11-19 ENCOUNTER — HEALTH MAINTENANCE LETTER (OUTPATIENT)
Age: 22
End: 2022-11-19

## 2023-03-05 NOTE — NURSING NOTE
"Chief Complaint   Patient presents with     Eye Problem       Initial /62  Temp 97.8  F (36.6  C) (Oral)  Ht 5' 11.25\" (1.81 m)  Wt 161 lb (73 kg)  BMI 22.3 kg/m2 Estimated body mass index is 22.3 kg/(m^2) as calculated from the following:    Height as of this encounter: 5' 11.25\" (1.81 m).    Weight as of this encounter: 161 lb (73 kg).  Medication Reconciliation: complete    " Yes - the patient is able to be screened

## 2023-04-09 ENCOUNTER — HEALTH MAINTENANCE LETTER (OUTPATIENT)
Age: 23
End: 2023-04-09

## 2024-06-16 ENCOUNTER — HEALTH MAINTENANCE LETTER (OUTPATIENT)
Age: 24
End: 2024-06-16

## 2025-06-21 ENCOUNTER — HEALTH MAINTENANCE LETTER (OUTPATIENT)
Age: 25
End: 2025-06-21